# Patient Record
Sex: MALE | Race: WHITE | Employment: FULL TIME | URBAN - METROPOLITAN AREA
[De-identification: names, ages, dates, MRNs, and addresses within clinical notes are randomized per-mention and may not be internally consistent; named-entity substitution may affect disease eponyms.]

---

## 2023-10-09 ENCOUNTER — TELEPHONE (OUTPATIENT)
Dept: ORTHOPEDICS CLINIC | Facility: CLINIC | Age: 33
End: 2023-10-09

## 2023-10-09 NOTE — TELEPHONE ENCOUNTER
Per wife pt has a fractured wrist and requesting to be seen this week. Pt will be self-pay aware of self-pay $256.50 fee.  Please advise

## 2023-10-16 RX ORDER — HYDROCODONE BITARTRATE AND ACETAMINOPHEN 5; 325 MG/1; MG/1
1 TABLET ORAL EVERY 4 HOURS PRN
COMMUNITY

## 2023-10-17 ENCOUNTER — ANESTHESIA (OUTPATIENT)
Dept: SURGERY | Facility: HOSPITAL | Age: 33
End: 2023-10-17
Payer: OTHER MISCELLANEOUS

## 2023-10-17 ENCOUNTER — APPOINTMENT (OUTPATIENT)
Dept: GENERAL RADIOLOGY | Facility: HOSPITAL | Age: 33
End: 2023-10-17
Attending: ORTHOPAEDIC SURGERY

## 2023-10-17 ENCOUNTER — HOSPITAL ENCOUNTER (OUTPATIENT)
Facility: HOSPITAL | Age: 33
Setting detail: HOSPITAL OUTPATIENT SURGERY
Discharge: HOME | End: 2023-10-17
Attending: ORTHOPAEDIC SURGERY | Admitting: ORTHOPAEDIC SURGERY

## 2023-10-17 ENCOUNTER — ANESTHESIA EVENT (OUTPATIENT)
Dept: SURGERY | Facility: HOSPITAL | Age: 33
End: 2023-10-17
Payer: OTHER MISCELLANEOUS

## 2023-10-17 VITALS
TEMPERATURE: 98 F | HEIGHT: 70 IN | WEIGHT: 144 LBS | SYSTOLIC BLOOD PRESSURE: 124 MMHG | HEART RATE: 108 BPM | BODY MASS INDEX: 20.62 KG/M2 | OXYGEN SATURATION: 97 % | RESPIRATION RATE: 17 BRPM | DIASTOLIC BLOOD PRESSURE: 63 MMHG

## 2023-10-17 PROCEDURE — 0PSL04Z REPOSITION LEFT ULNA WITH INTERNAL FIXATION DEVICE, OPEN APPROACH: ICD-10-PCS | Performed by: ORTHOPAEDIC SURGERY

## 2023-10-17 PROCEDURE — 76000 FLUOROSCOPY <1 HR PHYS/QHP: CPT | Performed by: ORTHOPAEDIC SURGERY

## 2023-10-17 PROCEDURE — 0PSJ04Z REPOSITION LEFT RADIUS WITH INTERNAL FIXATION DEVICE, OPEN APPROACH: ICD-10-PCS | Performed by: ORTHOPAEDIC SURGERY

## 2023-10-17 RX ORDER — HYDROCODONE BITARTRATE AND ACETAMINOPHEN 5; 325 MG/1; MG/1
1 TABLET ORAL EVERY 4 HOURS PRN
Qty: 30 TABLET | Refills: 0 | Status: SHIPPED | OUTPATIENT
Start: 2023-10-17

## 2023-10-17 RX ORDER — PROCHLORPERAZINE EDISYLATE 5 MG/ML
5 INJECTION INTRAMUSCULAR; INTRAVENOUS EVERY 8 HOURS PRN
Status: DISCONTINUED | OUTPATIENT
Start: 2023-10-17 | End: 2023-10-17

## 2023-10-17 RX ORDER — MORPHINE SULFATE 10 MG/ML
6 INJECTION, SOLUTION INTRAMUSCULAR; INTRAVENOUS EVERY 10 MIN PRN
Status: DISCONTINUED | OUTPATIENT
Start: 2023-10-17 | End: 2023-10-17

## 2023-10-17 RX ORDER — HYDROMORPHONE HYDROCHLORIDE 1 MG/ML
0.6 INJECTION, SOLUTION INTRAMUSCULAR; INTRAVENOUS; SUBCUTANEOUS EVERY 5 MIN PRN
Status: DISCONTINUED | OUTPATIENT
Start: 2023-10-17 | End: 2023-10-17

## 2023-10-17 RX ORDER — ONDANSETRON 2 MG/ML
4 INJECTION INTRAMUSCULAR; INTRAVENOUS EVERY 6 HOURS PRN
Status: DISCONTINUED | OUTPATIENT
Start: 2023-10-17 | End: 2023-10-17

## 2023-10-17 RX ORDER — MORPHINE SULFATE 4 MG/ML
2 INJECTION, SOLUTION INTRAMUSCULAR; INTRAVENOUS EVERY 10 MIN PRN
Status: DISCONTINUED | OUTPATIENT
Start: 2023-10-17 | End: 2023-10-17

## 2023-10-17 RX ORDER — ACETAMINOPHEN 500 MG
1000 TABLET ORAL ONCE
Status: COMPLETED | OUTPATIENT
Start: 2023-10-17 | End: 2023-10-17

## 2023-10-17 RX ORDER — PHENYLEPHRINE HCL 10 MG/ML
VIAL (ML) INJECTION AS NEEDED
Status: DISCONTINUED | OUTPATIENT
Start: 2023-10-17 | End: 2023-10-17 | Stop reason: SURG

## 2023-10-17 RX ORDER — MORPHINE SULFATE 4 MG/ML
4 INJECTION, SOLUTION INTRAMUSCULAR; INTRAVENOUS EVERY 10 MIN PRN
Status: DISCONTINUED | OUTPATIENT
Start: 2023-10-17 | End: 2023-10-17

## 2023-10-17 RX ORDER — HYDROMORPHONE HYDROCHLORIDE 1 MG/ML
0.2 INJECTION, SOLUTION INTRAMUSCULAR; INTRAVENOUS; SUBCUTANEOUS EVERY 5 MIN PRN
Status: DISCONTINUED | OUTPATIENT
Start: 2023-10-17 | End: 2023-10-17

## 2023-10-17 RX ORDER — CEFAZOLIN SODIUM/WATER 2 G/20 ML
SYRINGE (ML) INTRAVENOUS AS NEEDED
Status: DISCONTINUED | OUTPATIENT
Start: 2023-10-17 | End: 2023-10-17 | Stop reason: SURG

## 2023-10-17 RX ORDER — SODIUM CHLORIDE, SODIUM LACTATE, POTASSIUM CHLORIDE, CALCIUM CHLORIDE 600; 310; 30; 20 MG/100ML; MG/100ML; MG/100ML; MG/100ML
INJECTION, SOLUTION INTRAVENOUS CONTINUOUS
Status: DISCONTINUED | OUTPATIENT
Start: 2023-10-17 | End: 2023-10-17

## 2023-10-17 RX ORDER — HYDROMORPHONE HYDROCHLORIDE 1 MG/ML
0.4 INJECTION, SOLUTION INTRAMUSCULAR; INTRAVENOUS; SUBCUTANEOUS EVERY 5 MIN PRN
Status: DISCONTINUED | OUTPATIENT
Start: 2023-10-17 | End: 2023-10-17

## 2023-10-17 RX ORDER — LIDOCAINE HYDROCHLORIDE 10 MG/ML
INJECTION, SOLUTION EPIDURAL; INFILTRATION; INTRACAUDAL; PERINEURAL AS NEEDED
Status: DISCONTINUED | OUTPATIENT
Start: 2023-10-17 | End: 2023-10-17 | Stop reason: SURG

## 2023-10-17 RX ORDER — NALOXONE HYDROCHLORIDE 0.4 MG/ML
80 INJECTION, SOLUTION INTRAMUSCULAR; INTRAVENOUS; SUBCUTANEOUS AS NEEDED
Status: DISCONTINUED | OUTPATIENT
Start: 2023-10-17 | End: 2023-10-17

## 2023-10-17 RX ORDER — METOPROLOL TARTRATE 5 MG/5ML
INJECTION INTRAVENOUS AS NEEDED
Status: DISCONTINUED | OUTPATIENT
Start: 2023-10-17 | End: 2023-10-17 | Stop reason: SURG

## 2023-10-17 RX ORDER — CEFAZOLIN SODIUM/WATER 2 G/20 ML
2 SYRINGE (ML) INTRAVENOUS ONCE
Status: DISCONTINUED | OUTPATIENT
Start: 2023-10-17 | End: 2023-10-17 | Stop reason: HOSPADM

## 2023-10-17 RX ORDER — HYDROCODONE BITARTRATE AND ACETAMINOPHEN 5; 325 MG/1; MG/1
1 TABLET ORAL EVERY 4 HOURS PRN
Status: DISCONTINUED | OUTPATIENT
Start: 2023-10-17 | End: 2023-10-17

## 2023-10-17 RX ORDER — MIDAZOLAM HYDROCHLORIDE 1 MG/ML
INJECTION INTRAMUSCULAR; INTRAVENOUS AS NEEDED
Status: DISCONTINUED | OUTPATIENT
Start: 2023-10-17 | End: 2023-10-17 | Stop reason: SURG

## 2023-10-17 RX ADMIN — CEFAZOLIN SODIUM/WATER 2 G: 2 G/20 ML SYRINGE (ML) INTRAVENOUS at 12:45:00

## 2023-10-17 RX ADMIN — SODIUM CHLORIDE, SODIUM LACTATE, POTASSIUM CHLORIDE, CALCIUM CHLORIDE: 600; 310; 30; 20 INJECTION, SOLUTION INTRAVENOUS at 12:38:00

## 2023-10-17 RX ADMIN — SODIUM CHLORIDE, SODIUM LACTATE, POTASSIUM CHLORIDE, CALCIUM CHLORIDE: 600; 310; 30; 20 INJECTION, SOLUTION INTRAVENOUS at 13:10:00

## 2023-10-17 RX ADMIN — LIDOCAINE HYDROCHLORIDE 50 MG: 10 INJECTION, SOLUTION EPIDURAL; INFILTRATION; INTRACAUDAL; PERINEURAL at 12:43:00

## 2023-10-17 RX ADMIN — MIDAZOLAM HYDROCHLORIDE 2 MG: 1 INJECTION INTRAMUSCULAR; INTRAVENOUS at 12:39:00

## 2023-10-17 RX ADMIN — PHENYLEPHRINE HCL 100 MCG: 10 MG/ML VIAL (ML) INJECTION at 14:12:00

## 2023-10-17 RX ADMIN — PHENYLEPHRINE HCL 100 MCG: 10 MG/ML VIAL (ML) INJECTION at 14:39:00

## 2023-10-17 RX ADMIN — PHENYLEPHRINE HCL 100 MCG: 10 MG/ML VIAL (ML) INJECTION at 13:36:00

## 2023-10-17 RX ADMIN — METOPROLOL TARTRATE 1 MG: 5 INJECTION INTRAVENOUS at 12:55:00

## 2023-10-17 NOTE — ANESTHESIA PROCEDURE NOTES
Peripheral IV  Date/Time: 10/17/2023 1:10 PM  Inserted by: Celina Briones CRNA    Placement  Needle size: 20 G  Laterality: right  Location: forearm  Local anesthetic: none  Site prep: alcohol  Technique: anatomical landmarks  Attempts: 1

## 2023-10-17 NOTE — BRIEF OP NOTE
Pre-Operative Diagnosis: left  radius / ulna fracture     Post-Operative Diagnosis: left radius / ulna fracture      Procedure Performed:   ORIF left radius and ulna    Surgeon(s) and Role:     * Brenda Holley MD - Primary    Assistant(s):  Surgical Assistant.: Willian Childress  PA: Bobby Shannon PA-C     Surgical Findings: see dx     Specimen: none     Estimated Blood Loss: 10ml    Dictation Number:  [id number]    Andria Tate MD  10/17/2023  2:36 PM

## 2023-10-17 NOTE — ANESTHESIA PROCEDURE NOTES
Airway  Date/Time: 10/17/2023 1:16 PM  Urgency: Elective    Airway not difficult    General Information and Staff    Patient location during procedure: OR  Anesthesiologist: Noelle Dunn MD  Resident/CRNA: Diana Gómez CRNA  Performed: CRNA   Performed by: Diana Gómez CRNA  Authorized by: Noelle Dunn MD      Indications and Patient Condition  Indications for airway management: anesthesia  Sedation level: deep  Preoxygenated: yes  Patient position: sniffing  Mask difficulty assessment: 0 - not attempted    Final Airway Details  Final airway type: supraglottic airway      Successful airway: classic  Size 4  Airway Seal Pressure (cm H2O): 20       Number of attempts at approach: 1    Additional Comments  Atraumatic. Some bleeding noted at gums and from suture site of lower lip prior to induction of GA.

## 2023-10-18 NOTE — OPERATIVE REPORT
[facility]    PATIENT'S NAME: Anne-Marie Klein   ATTENDING PHYSICIAN: Emily Cordero MD   OPERATING PHYSICIAN: Emily Cordero MD   PATIENT ACCOUNT#:   [de-identified]    LOCATION:  [place] [address]  MEDICAL RECORD #:   [id number]       YOB: 1990  ADMISSION DATE:       10/17/2023      OPERATION DATE:  10/17/2023    OPERATIVE REPORT    PREOPERATIVE DIAGNOSIS:  Comminuted fractures of the left radius and ulna. POSTOPERATIVE DIAGNOSIS:  Comminuted fractures of the left radius and ulna. PROCEDURE:  Open reduction and internal fixation of left radius and ulna fractures with C-arm control and image interpretation. ASSISTANT:  Sari Pack PA-C     INDICATIONS:  This 70-year-old man sustained the above fracture a week ago at work when some heavy glass fell on his arm. He was splinted after a partial reduction was performed in the emergency room and referred to me and was seen yesterday. I recommended the above procedure. He had significant numbness in the hand prior to surgery, as outlined in his consultation note. Our discussion included the options for surgery as well as alternative treatments and the potential risks. This included, was not limited to, the risks of infection, nonunion or malunion, nerve or vascular injury, uncertain prognosis secondary to his soft tissue injury, the risk for unanticipated perioperative medical or orthopedic complications, etc.  Written consent was obtained. OPERATIVE TECHNIQUE:  The patient was brought to the operating room after an interscalene block was started by the anesthesiologist in the holding area. He was then placed under a general anesthesia because of difficulty achieving adequate sedation. The splint was removed from the left upper extremity. There was a large blister that was partial thickness over the volar radial aspect of the wrist.  The dermal envelope was intact and this was not an open fracture.   After the usual sterile prep and drape, the extremity was exsanguinated with an Esmarch and the tourniquet was inflated. A longitudinal incision was performed over the radius and the plane between the FCR and the radial artery was opened. The FCR muscle was partially lacerated but the tendon was intact. The radial artery was kinked but appeared to be intact. The pronator quadratus was reflected in an ulnar direction and the periosteum was elevated enough to expose the fracture fragments. There was marked comminution. There was both a shaft as well as a distal component to the radial fracture with a segmental fragment. Reduction was obtained and a 7-hole Synthes anatomic distal radius plate was applied and held with reduction clamps. The reduction was checked with the C-arm and then 2 screws were placed provisionally in the plate. Attention was then turned to the ulna. A longitudinal incision was made and the interval between the flexor and extensor carpi ulnaris muscles was used. Periosteum was elevated. The fracture was reduced. A 6-hole 3.5 mm semi-tubular plate was applied and held with reduction clamps. The x-rays were checked. Proximal and distal screws were then placed through this plate as well verifying that reduction was maintained. I then returned my attention to the radial plate. After adjusting it slightly, I placed cortical compression screws proximally, a compression screw distally, and then the remainder of the distal screws were locking screws. I then placed a compression screw through the ulnar plate and checked the C-arm in AP, lateral and oblique projections. The forearm demonstrated a full pronation and supination as well as flexion and extension of the wrist.  The tourniquet was deflated. The hand was well perfused with good capillary refill of the fingers and no arterial pulsatile bleeding.   The wounds were closed in layers in routine fashion and a sterile dressing and padded volar splint were applied. The patient was stable under the care of Anesthesia at the completion of the procedure.     Dictated By Kyle Myles MD  d: 10/17/2023 14:42:28  t: 10/17/2023 18:03:04  [facility] [id number]  RKN/

## 2024-03-21 ENCOUNTER — HOSPITAL ENCOUNTER (EMERGENCY)
Facility: HOSPITAL | Age: 34
Discharge: HOME | End: 2024-03-21
Attending: EMERGENCY MEDICINE

## 2024-03-21 VITALS
WEIGHT: 142 LBS | TEMPERATURE: 98 F | RESPIRATION RATE: 20 BRPM | BODY MASS INDEX: 20 KG/M2 | SYSTOLIC BLOOD PRESSURE: 139 MMHG | DIASTOLIC BLOOD PRESSURE: 86 MMHG | OXYGEN SATURATION: 98 % | HEART RATE: 113 BPM

## 2024-03-21 DIAGNOSIS — R73.9 HYPERGLYCEMIA: ICD-10-CM

## 2024-03-21 DIAGNOSIS — R03.0 ELEVATED BLOOD PRESSURE READING: ICD-10-CM

## 2024-03-21 DIAGNOSIS — E11.9 TYPE 2 DIABETES MELLITUS WITHOUT COMPLICATION, WITHOUT LONG-TERM CURRENT USE OF INSULIN (HCC): Primary | ICD-10-CM

## 2024-03-21 LAB
ACETONE SERPL QL: NEGATIVE
ALBUMIN SERPL-MCNC: 4.5 G/DL (ref 3.2–4.8)
ALBUMIN/GLOB SERPL: 1.4 {RATIO} (ref 1–2)
ALP LIVER SERPL-CCNC: 470 U/L
ALT SERPL-CCNC: 31 U/L
ANION GAP SERPL CALC-SCNC: 8 MMOL/L (ref 0–18)
AST SERPL-CCNC: 20 U/L (ref ?–34)
BASE EXCESS BLD CALC-SCNC: 4.5 MMOL/L (ref ?–2)
BASOPHILS # BLD AUTO: 0.01 X10(3) UL (ref 0–0.2)
BASOPHILS NFR BLD AUTO: 0.2 %
BILIRUB SERPL-MCNC: 0.4 MG/DL (ref 0.3–1.2)
BILIRUB UR QL: NEGATIVE
BUN BLD-MCNC: 13 MG/DL (ref 9–23)
BUN/CREAT SERPL: 16.3 (ref 10–20)
CALCIUM BLD-MCNC: 10 MG/DL (ref 8.7–10.4)
CHLORIDE SERPL-SCNC: 98 MMOL/L (ref 98–112)
CLARITY UR: CLEAR
CO2 SERPL-SCNC: 26 MMOL/L (ref 21–32)
COLOR UR: COLORLESS
CREAT BLD-MCNC: 0.8 MG/DL
DEPRECATED RDW RBC AUTO: 34.2 FL (ref 35.1–46.3)
EGFRCR SERPLBLD CKD-EPI 2021: 120 ML/MIN/1.73M2 (ref 60–?)
EOSINOPHIL # BLD AUTO: 0.1 X10(3) UL (ref 0–0.7)
EOSINOPHIL NFR BLD AUTO: 1.8 %
ERYTHROCYTE [DISTWIDTH] IN BLOOD BY AUTOMATED COUNT: 12.1 % (ref 11–15)
EST. AVERAGE GLUCOSE BLD GHB EST-MCNC: 243 MG/DL (ref 68–126)
GLOBULIN PLAS-MCNC: 3.3 G/DL (ref 2.8–4.4)
GLUCOSE BLD-MCNC: 506 MG/DL (ref 70–99)
GLUCOSE BLDC GLUCOMTR-MCNC: 156 MG/DL (ref 70–99)
GLUCOSE BLDC GLUCOMTR-MCNC: 374 MG/DL (ref 70–99)
GLUCOSE BLDC GLUCOMTR-MCNC: 433 MG/DL (ref 70–99)
GLUCOSE BLDC GLUCOMTR-MCNC: 466 MG/DL (ref 70–99)
GLUCOSE UR-MCNC: >1000 MG/DL
HBA1C MFR BLD: 10.1 % (ref ?–5.7)
HCO3 BLDV-SCNC: 28.1 MEQ/L (ref 22–26)
HCT VFR BLD AUTO: 43.8 %
HGB BLD-MCNC: 15.7 G/DL
HGB UR QL STRIP.AUTO: NEGATIVE
IMM GRANULOCYTES # BLD AUTO: 0.01 X10(3) UL (ref 0–1)
IMM GRANULOCYTES NFR BLD: 0.2 %
KETONES UR-MCNC: NEGATIVE MG/DL
LEUKOCYTE ESTERASE UR QL STRIP.AUTO: NEGATIVE
LYMPHOCYTES # BLD AUTO: 2.33 X10(3) UL (ref 1–4)
LYMPHOCYTES NFR BLD AUTO: 41.2 %
MCH RBC QN AUTO: 28.8 PG (ref 26–34)
MCHC RBC AUTO-ENTMCNC: 35.8 G/DL (ref 31–37)
MCV RBC AUTO: 80.2 FL
MONOCYTES # BLD AUTO: 0.77 X10(3) UL (ref 0.1–1)
MONOCYTES NFR BLD AUTO: 13.6 %
NEUTROPHILS # BLD AUTO: 2.43 X10 (3) UL (ref 1.5–7.7)
NEUTROPHILS # BLD AUTO: 2.43 X10(3) UL (ref 1.5–7.7)
NEUTROPHILS NFR BLD AUTO: 43 %
NITRITE UR QL STRIP.AUTO: NEGATIVE
OSMOLALITY SERPL CALC.SUM OF ELEC: 297 MOSM/KG (ref 275–295)
PCO2 BLDV: 46 MM HG (ref 38–50)
PH BLDV: 7.42 [PH] (ref 7.32–7.43)
PH UR: 6.5 [PH] (ref 5–8)
PLATELET # BLD AUTO: 194 10(3)UL (ref 150–450)
PO2 BLDV: 49 MM HG (ref 35–40)
POTASSIUM SERPL-SCNC: 4.4 MMOL/L (ref 3.5–5.1)
PROT SERPL-MCNC: 7.8 G/DL (ref 5.7–8.2)
PROT UR-MCNC: NEGATIVE MG/DL
PUNCTURE CHARGE: NO
RBC # BLD AUTO: 5.46 X10(6)UL
SAO2 % BLDV: 84.1 % (ref 60–85)
SODIUM SERPL-SCNC: 132 MMOL/L (ref 136–145)
SP GR UR STRIP: >1.03 (ref 1–1.03)
UROBILINOGEN UR STRIP-ACNC: NORMAL
WBC # BLD AUTO: 5.7 X10(3) UL (ref 4–11)

## 2024-03-21 PROCEDURE — 80053 COMPREHEN METABOLIC PANEL: CPT

## 2024-03-21 PROCEDURE — 80053 COMPREHEN METABOLIC PANEL: CPT | Performed by: EMERGENCY MEDICINE

## 2024-03-21 PROCEDURE — 85025 COMPLETE CBC W/AUTO DIFF WBC: CPT

## 2024-03-21 PROCEDURE — 82009 KETONE BODYS QUAL: CPT | Performed by: EMERGENCY MEDICINE

## 2024-03-21 PROCEDURE — 82962 GLUCOSE BLOOD TEST: CPT

## 2024-03-21 PROCEDURE — 85025 COMPLETE CBC W/AUTO DIFF WBC: CPT | Performed by: EMERGENCY MEDICINE

## 2024-03-21 PROCEDURE — 83036 HEMOGLOBIN GLYCOSYLATED A1C: CPT

## 2024-03-21 PROCEDURE — 81003 URINALYSIS AUTO W/O SCOPE: CPT | Performed by: EMERGENCY MEDICINE

## 2024-03-21 PROCEDURE — 82805 BLOOD GASES W/O2 SATURATION: CPT

## 2024-03-21 PROCEDURE — 99284 EMERGENCY DEPT VISIT MOD MDM: CPT

## 2024-03-21 PROCEDURE — 96361 HYDRATE IV INFUSION ADD-ON: CPT

## 2024-03-21 PROCEDURE — 96360 HYDRATION IV INFUSION INIT: CPT

## 2024-03-21 PROCEDURE — 83036 HEMOGLOBIN GLYCOSYLATED A1C: CPT | Performed by: EMERGENCY MEDICINE

## 2024-03-21 PROCEDURE — 99285 EMERGENCY DEPT VISIT HI MDM: CPT

## 2024-03-21 PROCEDURE — 82805 BLOOD GASES W/O2 SATURATION: CPT | Performed by: EMERGENCY MEDICINE

## 2024-03-21 RX ORDER — BLOOD SUGAR DIAGNOSTIC
1 STRIP MISCELLANEOUS 2 TIMES DAILY
Qty: 60 STRIP | Refills: 0 | Status: SHIPPED | OUTPATIENT
Start: 2024-03-21 | End: 2024-04-20

## 2024-03-21 RX ORDER — GLIPIZIDE 10 MG/1
10 TABLET ORAL
Qty: 30 TABLET | Refills: 0 | Status: SHIPPED | OUTPATIENT
Start: 2024-03-21 | End: 2024-04-20

## 2024-03-21 RX ORDER — GLIPIZIDE 5 MG/1
5 TABLET ORAL NIGHTLY
Qty: 30 TABLET | Refills: 0 | Status: SHIPPED | OUTPATIENT
Start: 2024-03-21 | End: 2024-04-20

## 2024-03-21 RX ORDER — ACETAMINOPHEN 500 MG
1000 TABLET ORAL ONCE
Status: DISCONTINUED | OUTPATIENT
Start: 2024-03-21 | End: 2024-03-21

## 2024-03-21 RX ORDER — INSULIN ASPART 100 [IU]/ML
0.2 INJECTION, SOLUTION INTRAVENOUS; SUBCUTANEOUS ONCE
Status: COMPLETED | OUTPATIENT
Start: 2024-03-21 | End: 2024-03-21

## 2024-03-21 NOTE — ED INITIAL ASSESSMENT (HPI)
Patient has been feeling increasingly tired, polyuria, and weight loss over the last six months. 2 days ago a friend checked his blood sugar with a home glucometer and it read over 500.

## 2024-03-21 NOTE — DISCHARGE INSTRUCTIONS
Thank you for seeking care at [facility] Emergency Department.  You have been seen and evaluated.    We discussed the results of your workup   Please read the instructions provided   If given prescriptions, take as instructed    Remember, your care process does not end after your visit today. Please follow-up with your doctor within 1-2 days for a follow-up check to ensure you are  improving, to see if you need any further evaluation/testing, or to evaluate for any alternate diagnoses.     Today in the ER you were diagnosed with diabetes given your high blood sugar.  Please take the medicines as prescribed.  You will take 10 mg of glipizide with breakfast and 5 mg with dinner.  You will take the metformin twice daily 500 mg.  If you have a blood sugar less than 80 please call the endocrinology office.  Please check your blood sugar before breakfast and dinner every day.    Your blood pressure was noted to be elevated in the ER today. Please follow up with your primary doctor within the next 2-3 months for a reevaluation. Uncontrolled high blood pressure can lead to strokes, heart attacks, and kidney failure.     Please return to the emergency department immediately if you develop chest pain, difficulty breathing, inability to drink liquids without vomiting, one sided numbness or weakness, slurred speech, severe headache, or if you develop any other new or concerning symptoms as these could be signs of more serious medical illness.    We hope you feel better.

## 2024-03-21 NOTE — ED PROVIDER NOTES
Patient Seen in: [facility] Emergency Department      History     Chief Complaint   Patient presents with    Hyperglycemia     Stated Complaint: wants bloodwork    Subjective:   HPI    33M no significant PMHx presenting with elevated blood sugar from home also c/o months of unnintentional weight loss, fatigue, polyuria.  Patient states that he has been feeling increasingly thirsty and urinating more frequently, and has unintentionally lost several pounds over the last few months.  He denies any difficulty breathing, chest pain, abdominal pain, flank pain.  Denies any dysuria or hematuria.  Denies vomiting but does feel nauseated.  States he was discussing with his friend and checked his blood sugar with his friend and found his blood sugar was high so he came to the ER with concern for new onset diabetes.  Denies any family history of diabetes. Denies any recent URI sx or illness in the past few months. Denies any other complaints at this time.    Objective:   Past Medical History:   Diagnosis Date    Pyloric stenosis (HCC)     Wrist fracture, bilateral 10/16/2023              Past Surgical History:   Procedure Laterality Date    SPINE SURGERY PROCEDURE UNLISTED      AS A BABY                Social History     Socioeconomic History    Marital status:    Tobacco Use    Smoking status: Some Days     Packs/day: .5     Types: Cigarettes    Smokeless tobacco: Never   Substance and Sexual Activity    Alcohol use: Never    Drug use: Yes     Types: Cannabis, Cocaine     Comment: last month last used              Review of Systems    Positive for stated complaint: wants bloodwork  Other systems are as noted in HPI.  Constitutional and vital signs reviewed.      All other systems reviewed and negative except as noted above.    Physical Exam     ED Triage Vitals   BP 03/21/24 1206 (!) 166/86   Pulse 03/21/24 1206 (!) 132   Resp 03/21/24 1206 20   Temp 03/21/24 1206 98 °F (36.7 °C)   Temp src --    SpO2 03/21/24  1206 97 %   O2 Device 03/21/24 1400 None (Room air)       Current:/86   Pulse 113   Temp 98 °F (36.7 °C)   Resp 20   Wt 64.4 kg   SpO2 98%   BMI 20.37 kg/m²         Physical Exam  Vitals and nursing note reviewed.   Constitutional:       General: He is not in acute distress.     Appearance: He is not ill-appearing or toxic-appearing.   HENT:      Head: Normocephalic and atraumatic.      Right Ear: External ear normal.      Left Ear: External ear normal.      Nose: Nose normal.      Mouth/Throat:      Mouth: Mucous membranes are dry.   Eyes:      Conjunctiva/sclera: Conjunctivae normal.   Cardiovascular:      Rate and Rhythm: Regular rhythm. Tachycardia present.      Heart sounds: No murmur heard.  Pulmonary:      Effort: Pulmonary effort is normal. No respiratory distress.      Breath sounds: No wheezing, rhonchi or rales.   Abdominal:      General: There is no distension.      Palpations: Abdomen is soft.      Tenderness: There is no abdominal tenderness. There is no guarding or rebound.   Musculoskeletal:         General: No deformity.      Right lower leg: No edema.      Left lower leg: No edema.   Skin:     General: Skin is warm and dry.   Neurological:      Mental Status: He is alert.                MDM      This patient presents with polyuria, leg polydipsia, unintended weight loss, fatigue for the past 2 months.  On arrival patient hyperglycemic with blood sugar initially in the 400s, concerning for underlying diabetes.  Patient tachycardic as well on arrival though on my assessment heart rate in the low 100s, satting well on room air, lungs clear, otherwise appears well-perfused.    Differential diagnoses considered includes, but is not limited to:   New onset diabetes  DKA  Dehydration  Electrolyte abnormality  UTI  Less likely insulinoma     Will obtain the following tests: cbc, cmp, A1c, vbg, ua     Will administer the following medications/therapies: IV NS, subcutaneous insulin     Please see  ED course for my independent review of these tests/imaging results.    Chronic conditions affecting care: n/a     Workup and medications considered but not ordered: n/a     Social Determinants of Health that impacted care: n/a         ED Course as of 03/21/24 1849  ------------------------------------------------------------  Time: 03/21 1232  Value: VENOUS pH: 7.42  Comment: (Reviewed)  ------------------------------------------------------------  Time: 03/21 1232  Comment: Cbc unremarkable. Vbg normal pH not c/w DKA. Bmp with hyperglycemia slightly hyponatremic 2/2 hyperglycemia, K normal, cr normal. Acetone negative.   ------------------------------------------------------------  Time: 03/21 1358  Value: HEMOGLOBIN A1c(!): 10.1  Comment: (Reviewed)  ------------------------------------------------------------  Time: 03/21 1450  Value: Spec Gravity(!): >1.030  Comment: (Reviewed)  ------------------------------------------------------------  Time: 03/21 1450  Comment: UA unremarkable other than glucosuria. Discussed patient's case with endocrinology on-call . would like pt started on metformin 500 mg BID, glipizide 10mg QAM and 5mg at bedtime, and with glucometer to check sugar BID before breakfast and dinner. Rx for all of the above sent to pharmacy. Tachycardia improved after 2L NS bolus. Rpt blood sugar 156.  Counseled patient extensively on strict return precautions, outpatient follow-up, and reasons to return immediately to the ER.  He verbalized understanding.    Will DC at this time   ------------------------------------------------------------  Time: 03/21 8092  Comment: Pt counseled on smoking cessation          Disposition and Plan     Clinical Impression:  1. Type 2 diabetes mellitus without complication, without long-term current use of insulin (HCC)    2. Hyperglycemia    3. Elevated blood pressure reading         Disposition:  Discharge  3/21/2024  2:28 pm    Follow-up:  Merari Chau,  MD  [address]  [address]  [facility] [address]  [phone]    Schedule an appointment as soon as possible for a visit in 3 day(s)      [facility]  [address]  [facility] [id number] [phone]  Go to  If symptoms worsen, immediately    Jeff Bentley MD  [address]  [address]  [facility] [address]  [phone]    Schedule an appointment as soon as possible for a visit in 2 day(s)  As needed if you do not have a primary doctor          Medications Prescribed:  Discharge Medication List as of 3/21/2024  2:44 PM        START taking these medications    Details   !! glipiZIDE 5 MG Oral Tab Take 1 tablet (5 mg total) by mouth at bedtime., Normal, Disp-30 tablet, R-0      !! glipiZIDE 10 MG Oral Tab Take 1 tablet (10 mg total) by mouth daily with breakfast., Normal, Disp-30 tablet, R-0      metFORMIN 500 MG Oral Tab Take 1 tablet (500 mg total) by mouth in the morning and 1 tablet (500 mg total) before bedtime., Normal, Disp-60 tablet, R-0      Blood Glucose Monitoring Suppl (D-CARE GLUCOMETER) w/Device Does not apply Kit 1 kit  in the morning and 1 kit before bedtime. Check blood sugar every morning before breakfast and every evening before bedtime.., Normal, Disp-1 kit, R-0      Glucose Blood (BLOOD GLUCOSE TEST STRIPS 333) In Vitro Strip 1 strip by In Vitro route in the morning and 1 strip before bedtime., Normal, Disp-60 strip, R-0       !! - Potential duplicate medications found. Please discuss with provider.        Marily Resendiz, DO  Attending Physician  Emergency Medicine

## 2024-03-22 ENCOUNTER — TELEPHONE (OUTPATIENT)
Dept: ENDOCRINOLOGY CLINIC | Facility: CLINIC | Age: 34
End: 2024-03-22

## 2024-03-22 NOTE — TELEPHONE ENCOUNTER
Request from ER  Please call and book new consult with any provider who can get him in the soonest F  For Type 2 DM  Thanks

## 2024-04-02 NOTE — TELEPHONE ENCOUNTER
Called patient and spoke with him regarding making an appointment. While on the call the call disconnected. Tried calling again x2 and call rang 3 ties and then informed there is no voicemail box set up.